# Patient Record
(demographics unavailable — no encounter records)

---

## 2025-03-18 NOTE — PHYSICAL EXAM
[de-identified] : R elbow: mild swelling medial elbow Tender medial epicondyle Pain with ROM Pain with forced wrist flexion Palp masses at the medial elbow- one is about 2cm; the other is about 1cm Both are nontender, solid, firm  Todays Xrays PA/Lateral/Oblique of the R elbow shows no mass, no OA  R IF  Healed scar dorsal Prox phalanx Very limited ROM Tender  Todays Xrays PA/Lateral/Oblique of the R IF shows hazy bone at the prox phalanx, ?osteomyelitis

## 2025-03-18 NOTE — ASSESSMENT
[FreeTextEntry1] : Medial epicondylitis is an acute complicated injury. Treatment options for epicondylitis includes OTC NSAIDS, prescription NSAIDS, ice, OT, cortisone injection, PRP, and surgical repair.   I recommend the patient take over the counter medication such as Advil/Motrin/Aleve or Tylenol for pain and inflammation  R medial epicondyle tendon origin injection was performed because of pain and inflammation under aseptic conditions with betadine and alcohol Anesthesia: ethyl chloride sprayed topically Celestone 6mg/1cc: An injection of Celestone 2 cc Lidocaine: An injection of Lidocaine 1% 2 cc Marcaine: An injection of Marcaine 0.5% 2 cc 25G needle     The risks, benefits, and alternatives to cortisone injection were explained in full to the patient. Risks outlined include but are not limited to infection, sepsis, bleeding, scarring, skin discoloration, temporary increase in pain, syncopal episode, failure to resolve symptoms, allergic reaction, symptom recurrence, and elevation of blood sugar in diabetics. Patient understood the risks. All questions were answered. After discussion of options, patient verbally consented to an injection. Sterile prep was done of the injection site. Patient tolerated the procedure well. Advised to ice the injection site this evening.    Elbow mass He needs MRI to further eval mass- I am concered about malignancy Return after MRI  Fnger He needs MRI I am concerned about osteomyelitis which would need IV ABx and or surgical intervention Return after MRI

## 2025-03-18 NOTE — HISTORY OF PRESENT ILLNESS
[5] : 5 [Dull/Aching] : dull/aching [de-identified] : R elbow pain for 2-3 months Medial sided  He reports mult elbow masses at the medial elbow getting larger recently  Masses have been present for 2-3 months  R IF infection 10 years ago- had mult surgeries at that time Now has stiffness and pain

## 2025-03-18 NOTE — REASON FOR VISIT
[FreeTextEntry2] : 03/14/2025: 40-year-old male here today for new Injury of the RT elbow. The RT elbow is causing pain when he throws a ball, working out and lifting weights. He also wants to report his RT IF pain. He had sx of it twice and had piece crab lodged in there which infected the finger. The finger has been hard to bend and use properly ever since the sx. Finger sx was 10 years ago.

## 2025-03-18 NOTE — PHYSICAL EXAM
[de-identified] : R elbow: mild swelling medial elbow Tender medial epicondyle Pain with ROM Pain with forced wrist flexion Palp masses at the medial elbow- one is about 2cm; the other is about 1cm Both are nontender, solid, firm  Todays Xrays PA/Lateral/Oblique of the R elbow shows no mass, no OA  R IF  Healed scar dorsal Prox phalanx Very limited ROM Tender  Todays Xrays PA/Lateral/Oblique of the R IF shows hazy bone at the prox phalanx, ?osteomyelitis

## 2025-03-18 NOTE — HISTORY OF PRESENT ILLNESS
[5] : 5 [Dull/Aching] : dull/aching [de-identified] : R elbow pain for 2-3 months Medial sided  He reports mult elbow masses at the medial elbow getting larger recently  Masses have been present for 2-3 months  R IF infection 10 years ago- had mult surgeries at that time Now has stiffness and pain

## 2025-07-15 NOTE — PHYSICAL EXAM
[Right] : right elbow [NL (150)] : flexion 150 degrees [NL (0)] : extension 0 degrees [5___] : extension 5[unfilled]/5 [] : no tenderness over olecranon

## 2025-07-15 NOTE — ASSESSMENT
[FreeTextEntry1] : R Medial Epicondylitis. This is a chronic condition that can take months to resolve. Xrays reviewed. Medial epicondyle injection given 5/14/25. Tennis Elbow brace. HEP demonstrated. Ice. Diclofenac 75mg BID prn pain and topical sent. Advised to consult ortho onc due to multiple lipomas for monitoring. Dr. Peters info provided. RTO prn.

## 2025-07-15 NOTE — HISTORY OF PRESENT ILLNESS
[7] : 7 [4] : 4 [] : yes [de-identified] : 7/15/2025- 39 yo RHD male with right elbow pain since about Jan 2025. Denies specific injury but is active. He saw Dr. Mc and had CSI  5/14/25. He continues to have pain medial elbow. No prior injuries. He has multiple lipomas.  PMHx: denies